# Patient Record
Sex: MALE | Race: WHITE | NOT HISPANIC OR LATINO | URBAN - METROPOLITAN AREA
[De-identification: names, ages, dates, MRNs, and addresses within clinical notes are randomized per-mention and may not be internally consistent; named-entity substitution may affect disease eponyms.]

---

## 2018-10-29 ENCOUNTER — OFFICE (OUTPATIENT)
Dept: URBAN - METROPOLITAN AREA CLINIC 33 | Facility: CLINIC | Age: 68
End: 2018-10-29

## 2018-10-29 VITALS
HEART RATE: 68 BPM | TEMPERATURE: 97 F | HEIGHT: 74 IN | WEIGHT: 221 LBS | SYSTOLIC BLOOD PRESSURE: 170 MMHG | DIASTOLIC BLOOD PRESSURE: 104 MMHG

## 2018-10-29 DIAGNOSIS — N18.9 CHRONIC KIDNEY DISEASE, UNSPECIFIED: ICD-10-CM

## 2018-10-29 DIAGNOSIS — I25.10 ATHEROSCLEROTIC HEART DISEASE OF NATIVE CORONARY ARTERY WITH: ICD-10-CM

## 2018-10-29 DIAGNOSIS — R19.4 CHANGE IN BOWEL HABIT: ICD-10-CM

## 2018-10-29 DIAGNOSIS — Z86.010 PERSONAL HISTORY OF COLONIC POLYPS: ICD-10-CM

## 2018-10-29 DIAGNOSIS — J44.9 CHRONIC OBSTRUCTIVE PULMONARY DISEASE, UNSPECIFIED: ICD-10-CM

## 2018-10-29 PROCEDURE — 99204 OFFICE O/P NEW MOD 45 MIN: CPT

## 2018-10-29 NOTE — SERVICEHPINOTES
HUMA JOAQUIN   is a   68   year old male who is being seen in consultation at the request of    for OV prior to colonoscopy. He states last week he was having abdominal pain along with diarrhea followed by constipation. However, everything has since resolved, believes it was a virus. BMs are now back to normal--he usually has 3 BMs qAM, BSS type 4, but small amts at a time. States he is overdue for colonoscopy--his last one was 7/2012 with Dr Sen in [place]--states he had polyps and was told to return in 3 years. He reports occasional brbpr when wiping due to hemorrhoids that he has had for 20 years. No longer having any abdominal pain. No weight loss, fever, or chills. No family hx of IBD or CRC.Maxx has a h/o CKD, CAD, and COPD. He had an MI in 2011 but has been stable since with no issues--sees cardiologist q6 months. He was a smoker, 1 ppd, but completely stopped shortly after MI in 2011. He uses O2 prn at night only, does not use oxygen during the day. He carefully monitors pulse ox with handheld monitor he keeps in his pocket. No chest pain, palpitations. SOB only if he walks a long period of time.JORGE